# Patient Record
Sex: FEMALE | Race: WHITE | NOT HISPANIC OR LATINO | URBAN - METROPOLITAN AREA
[De-identification: names, ages, dates, MRNs, and addresses within clinical notes are randomized per-mention and may not be internally consistent; named-entity substitution may affect disease eponyms.]

---

## 2021-10-11 ENCOUNTER — HOSPITAL ENCOUNTER (EMERGENCY)
Age: 56
Discharge: LEFT BEFORE DISCHARGE | End: 2021-10-11

## 2021-10-11 VITALS
RESPIRATION RATE: 18 BRPM | SYSTOLIC BLOOD PRESSURE: 141 MMHG | TEMPERATURE: 98 F | OXYGEN SATURATION: 98 % | DIASTOLIC BLOOD PRESSURE: 97 MMHG | HEART RATE: 103 BPM

## 2022-02-18 ENCOUNTER — LAB ENCOUNTER (OUTPATIENT)
Dept: LAB | Age: 57
End: 2022-02-18
Attending: FAMILY MEDICINE
Payer: COMMERCIAL

## 2022-02-18 ENCOUNTER — OFFICE VISIT (OUTPATIENT)
Dept: FAMILY MEDICINE CLINIC | Facility: CLINIC | Age: 57
End: 2022-02-18
Payer: COMMERCIAL

## 2022-02-18 ENCOUNTER — HOSPITAL ENCOUNTER (OUTPATIENT)
Dept: GENERAL RADIOLOGY | Facility: HOSPITAL | Age: 57
Discharge: HOME | End: 2022-02-18
Attending: FAMILY MEDICINE
Payer: COMMERCIAL

## 2022-02-18 VITALS
DIASTOLIC BLOOD PRESSURE: 83 MMHG | HEART RATE: 92 BPM | BODY MASS INDEX: 27.92 KG/M2 | SYSTOLIC BLOOD PRESSURE: 131 MMHG | HEIGHT: 67.5 IN | WEIGHT: 180 LBS

## 2022-02-18 DIAGNOSIS — Z00.00 ANNUAL PHYSICAL EXAM: Primary | ICD-10-CM

## 2022-02-18 DIAGNOSIS — R73.9 HYPERGLYCEMIA: ICD-10-CM

## 2022-02-18 DIAGNOSIS — Z00.00 ANNUAL PHYSICAL EXAM: ICD-10-CM

## 2022-02-18 DIAGNOSIS — U07.1 COVID: ICD-10-CM

## 2022-02-18 DIAGNOSIS — Z12.39 ENCOUNTER FOR SCREENING FOR MALIGNANT NEOPLASM OF BREAST, UNSPECIFIED SCREENING MODALITY: ICD-10-CM

## 2022-02-18 PROBLEM — E11.9 TYPE 2 DIABETES MELLITUS (HCC): Status: ACTIVE | Noted: 2022-02-18

## 2022-02-18 LAB
ALBUMIN SERPL-MCNC: 4.2 G/DL (ref 3.4–5)
ALBUMIN/GLOB SERPL: 1.4 {RATIO} (ref 1–2)
ALP LIVER SERPL-CCNC: 72 U/L
ALT SERPL-CCNC: 58 U/L
ANION GAP SERPL CALC-SCNC: 8 MMOL/L (ref 0–18)
AST SERPL-CCNC: 16 U/L (ref 15–37)
BASOPHILS # BLD AUTO: 0.04 X10(3) UL (ref 0–0.2)
BASOPHILS NFR BLD AUTO: 0.9 %
BILIRUB SERPL-MCNC: 0.4 MG/DL (ref 0.1–2)
BUN BLD-MCNC: 19 MG/DL (ref 7–18)
BUN/CREAT SERPL: 30.6 (ref 10–20)
CALCIUM BLD-MCNC: 9.5 MG/DL (ref 8.5–10.1)
CARTRIDGE EXPIRATION DATE: ABNORMAL DATE
CHLORIDE SERPL-SCNC: 106 MMOL/L (ref 98–112)
CHOLEST SERPL-MCNC: 265 MG/DL (ref ?–200)
CO2 SERPL-SCNC: 27 MMOL/L (ref 21–32)
CREAT BLD-MCNC: 0.62 MG/DL
DEPRECATED RDW RBC AUTO: 41 FL (ref 35.1–46.3)
EOSINOPHIL # BLD AUTO: 0.08 X10(3) UL (ref 0–0.7)
EOSINOPHIL NFR BLD AUTO: 1.8 %
ERYTHROCYTE [DISTWIDTH] IN BLOOD BY AUTOMATED COUNT: 12.3 % (ref 11–15)
FASTING PATIENT LIPID ANSWER: YES
FASTING STATUS PATIENT QL REPORTED: YES
GLOBULIN PLAS-MCNC: 3.1 G/DL (ref 2.8–4.4)
GLUCOSE BLD-MCNC: 121 MG/DL (ref 70–99)
HCT VFR BLD AUTO: 46.4 %
HDLC SERPL-MCNC: 48 MG/DL (ref 40–59)
HEMOGLOBIN A1C: 6.3 % (ref 4.3–5.6)
HGB BLD-MCNC: 15 G/DL
IMM GRANULOCYTES # BLD AUTO: 0 X10(3) UL (ref 0–1)
IMM GRANULOCYTES NFR BLD: 0 %
LDLC SERPL CALC-MCNC: 182 MG/DL (ref ?–100)
LYMPHOCYTES # BLD AUTO: 1.53 X10(3) UL (ref 1–4)
LYMPHOCYTES NFR BLD AUTO: 33.8 %
MCH RBC QN AUTO: 29.5 PG (ref 26–34)
MCHC RBC AUTO-ENTMCNC: 32.3 G/DL (ref 31–37)
MCV RBC AUTO: 91.2 FL
MONOCYTES # BLD AUTO: 0.28 X10(3) UL (ref 0.1–1)
MONOCYTES NFR BLD AUTO: 6.2 %
NEUTROPHILS # BLD AUTO: 2.6 X10 (3) UL (ref 1.5–7.7)
NEUTROPHILS # BLD AUTO: 2.6 X10(3) UL (ref 1.5–7.7)
NEUTROPHILS NFR BLD AUTO: 57.3 %
NONHDLC SERPL-MCNC: 217 MG/DL (ref ?–130)
OSMOLALITY SERPL CALC.SUM OF ELEC: 296 MOSM/KG (ref 275–295)
PLATELET # BLD AUTO: 214 10(3)UL (ref 150–450)
POTASSIUM SERPL-SCNC: 4.5 MMOL/L (ref 3.5–5.1)
PROT SERPL-MCNC: 7.3 G/DL (ref 6.4–8.2)
RBC # BLD AUTO: 5.09 X10(6)UL
SODIUM SERPL-SCNC: 141 MMOL/L (ref 136–145)
TRIGL SERPL-MCNC: 185 MG/DL (ref 30–149)
TSI SER-ACNC: 1.65 MIU/ML (ref 0.36–3.74)
VLDLC SERPL CALC-MCNC: 38 MG/DL (ref 0–30)
WBC # BLD AUTO: 4.5 X10(3) UL (ref 4–11)

## 2022-02-18 PROCEDURE — 83036 HEMOGLOBIN GLYCOSYLATED A1C: CPT | Performed by: FAMILY MEDICINE

## 2022-02-18 PROCEDURE — 80061 LIPID PANEL: CPT

## 2022-02-18 PROCEDURE — 85025 COMPLETE CBC W/AUTO DIFF WBC: CPT

## 2022-02-18 PROCEDURE — 3079F DIAST BP 80-89 MM HG: CPT | Performed by: FAMILY MEDICINE

## 2022-02-18 PROCEDURE — 3008F BODY MASS INDEX DOCD: CPT | Performed by: FAMILY MEDICINE

## 2022-02-18 PROCEDURE — 80053 COMPREHEN METABOLIC PANEL: CPT

## 2022-02-18 PROCEDURE — 84443 ASSAY THYROID STIM HORMONE: CPT

## 2022-02-18 PROCEDURE — 99386 PREV VISIT NEW AGE 40-64: CPT | Performed by: FAMILY MEDICINE

## 2022-02-18 PROCEDURE — 3075F SYST BP GE 130 - 139MM HG: CPT | Performed by: FAMILY MEDICINE

## 2022-02-18 PROCEDURE — 36415 COLL VENOUS BLD VENIPUNCTURE: CPT

## 2022-02-18 PROCEDURE — 71046 X-RAY EXAM CHEST 2 VIEWS: CPT | Performed by: FAMILY MEDICINE

## 2022-02-18 RX ORDER — AMLODIPINE BESYLATE 5 MG/1
5 TABLET ORAL DAILY
Qty: 90 TABLET | Refills: 0 | Status: SHIPPED | OUTPATIENT
Start: 2022-02-18

## 2022-02-18 RX ORDER — LOSARTAN POTASSIUM 50 MG/1
50 TABLET ORAL DAILY
COMMUNITY
Start: 2022-02-08 | End: 2022-02-18

## 2022-02-18 RX ORDER — LOSARTAN POTASSIUM 50 MG/1
50 TABLET ORAL DAILY
Qty: 90 TABLET | Refills: 0 | Status: SHIPPED | OUTPATIENT
Start: 2022-02-18

## 2022-02-18 RX ORDER — ERGOCALCIFEROL 1.25 MG/1
50000 CAPSULE ORAL WEEKLY
Qty: 90 CAPSULE | Refills: 0 | Status: SHIPPED | OUTPATIENT
Start: 2022-02-18

## 2022-02-18 RX ORDER — AMLODIPINE BESYLATE 5 MG/1
5 TABLET ORAL DAILY
COMMUNITY
Start: 2021-12-15 | End: 2022-02-18

## 2022-02-18 RX ORDER — ERGOCALCIFEROL 1.25 MG/1
50000 CAPSULE ORAL WEEKLY
COMMUNITY
Start: 2022-02-04 | End: 2022-02-18

## 2022-02-20 ENCOUNTER — TELEPHONE (OUTPATIENT)
Dept: FAMILY MEDICINE CLINIC | Facility: CLINIC | Age: 57
End: 2022-02-20

## 2022-02-28 ENCOUNTER — TELEPHONE (OUTPATIENT)
Dept: FAMILY MEDICINE CLINIC | Facility: CLINIC | Age: 57
End: 2022-02-28

## 2022-02-28 RX ORDER — ROSUVASTATIN CALCIUM 20 MG/1
20 TABLET, COATED ORAL NIGHTLY
Qty: 90 TABLET | Refills: 1 | Status: SHIPPED | OUTPATIENT
Start: 2022-02-28

## 2022-03-21 ENCOUNTER — TELEPHONE (OUTPATIENT)
Dept: FAMILY MEDICINE CLINIC | Facility: CLINIC | Age: 57
End: 2022-03-21

## 2022-05-07 NOTE — TELEPHONE ENCOUNTER
Passes protocol but with POP UP HIGH WARNING ALERT. Refill passed per [facility] protocol.      Requested Prescriptions   Pending Prescriptions Disp Refills    LOSARTAN 48 MG Oral Tab [Pharmacy Med Name: LOSARTAN 50MG TABLETS] 90 tablet 0     Sig: TAKE 1 TABLET(50 MG) BY MOUTH DAILY        Hypertensive Medications Protocol Passed - 5/7/2022  8:04 AM        Passed - CMP or BMP in past 12 months        Passed - Appointment in past 6 or next 3 months        Passed - GFR Non- > 50     Lab Results   Component Value Date    GFRNAA 102 02/18/2022                        Future Appointments         Provider Department Appt Notes    In 1 week Ivy Mckeon MD [facility], [place] 3 month f/u             Recent Outpatient Visits              2 months ago Annual physical exam    Tanvir Kelley MD    Office Visit

## 2022-05-16 RX ORDER — AMLODIPINE BESYLATE 5 MG/1
5 TABLET ORAL DAILY
Qty: 90 TABLET | Refills: 0 | Status: SHIPPED | OUTPATIENT
Start: 2022-05-16

## 2022-05-16 NOTE — TELEPHONE ENCOUNTER
Message noted: Chart reviewed and may refill medication as requested. Prescription sent to listed pharmacy. Please notify patient.  Further refills as per Dr. MONACO

## 2022-05-16 NOTE — TELEPHONE ENCOUNTER
Refill passed per [facility] protocol. Routed to Dr. Kathy Rock to review HIGH interaction warning. Dr. Olga Merlin out of office.    Requested Prescriptions   Pending Prescriptions Disp Refills    AMLODIPINE 5 MG Oral Tab [Pharmacy Med Name: AMLODIPINE BESYLATE 5MG TABLETS] 90 tablet 0     Sig: TAKE 1 TABLET(5 MG) BY MOUTH DAILY        Hypertensive Medications Protocol Passed - 5/15/2022  3:32 PM        Passed - CMP or BMP in past 12 months        Passed - Appointment in past 6 or next 3 months        Passed - GFR Non- > 50     Lab Results   Component Value Date    GFRNAA 102 02/18/2022                      Recent Outpatient Visits              2 months ago Annual physical exam    Aura Leyden, MD    Office Visit           Future Appointments         Provider Department Appt Notes    In 4 days Carmel Conner MD [facility] [facility], [facility], [facility]u

## 2022-05-17 NOTE — TELEPHONE ENCOUNTER
Refill passed per [address] [place] protocol.     Requested Prescriptions   Pending Prescriptions Disp Refills    METFORMIN 500 MG Oral Tab [Pharmacy Med Name: METFORMIN 500MG TABLETS] 90 tablet 1     Sig: TAKE 1 TABLET(500 MG) BY MOUTH DAILY WITH BREAKFAST        Diabetes Medication Protocol Passed - 5/17/2022  8:04 AM        Passed - Last A1C < 7.5 and within past 6 months     Lab Results   Component Value Date    A1C 6.3 (A) 02/18/2022               Passed - Appointment in past 6 or next 3 months        Passed - GFR Non- > 50     Lab Results   Component Value Date    GFRNAA 102 02/18/2022                 Passed - GFR in the past 12 months              Recent Outpatient Visits              2 months ago Annual physical exam    Rama Ugarte MD    Office Visit            Future Appointments         Provider Department Appt Notes    In 3 days Blayne Tai MD [address], [address] 3 month f/u

## 2022-05-19 RX ORDER — LOSARTAN POTASSIUM 50 MG/1
50 TABLET ORAL DAILY
Qty: 90 TABLET | Refills: 0 | Status: SHIPPED | OUTPATIENT
Start: 2022-05-19 | End: 2022-12-06

## 2022-05-20 ENCOUNTER — OFFICE VISIT (OUTPATIENT)
Dept: FAMILY MEDICINE CLINIC | Facility: CLINIC | Age: 57
End: 2022-05-20
Payer: COMMERCIAL

## 2022-05-20 VITALS
BODY MASS INDEX: 26.99 KG/M2 | DIASTOLIC BLOOD PRESSURE: 80 MMHG | WEIGHT: 174 LBS | HEART RATE: 90 BPM | HEIGHT: 67.5 IN | SYSTOLIC BLOOD PRESSURE: 132 MMHG

## 2022-05-20 DIAGNOSIS — Z00.00 ANNUAL PHYSICAL EXAM: Primary | ICD-10-CM

## 2022-05-20 DIAGNOSIS — Z12.39 ENCOUNTER FOR SCREENING FOR MALIGNANT NEOPLASM OF BREAST, UNSPECIFIED SCREENING MODALITY: ICD-10-CM

## 2022-05-20 DIAGNOSIS — Z12.31 ENCOUNTER FOR SCREENING MAMMOGRAM FOR BREAST CANCER: ICD-10-CM

## 2022-05-20 DIAGNOSIS — I10 ESSENTIAL HYPERTENSION: ICD-10-CM

## 2022-05-20 DIAGNOSIS — Z12.11 SCREENING FOR COLON CANCER: ICD-10-CM

## 2022-05-20 DIAGNOSIS — E11.9 DIABETES MELLITUS TYPE 2 IN NONOBESE (HCC): ICD-10-CM

## 2022-05-20 PROBLEM — U07.1 DISEASE DUE TO SEVERE ACUTE RESPIRATORY SYNDROME CORONAVIRUS 2 (SARS-COV-2): Status: ACTIVE | Noted: 2022-05-20

## 2022-05-20 LAB
CARTRIDGE EXPIRATION DATE: ABNORMAL DATE
HEMOGLOBIN A1C: 6.4 % (ref 4.3–5.6)

## 2022-05-20 PROCEDURE — 3075F SYST BP GE 130 - 139MM HG: CPT | Performed by: FAMILY MEDICINE

## 2022-05-20 PROCEDURE — 3044F HG A1C LEVEL LT 7.0%: CPT | Performed by: FAMILY MEDICINE

## 2022-05-20 PROCEDURE — 83036 HEMOGLOBIN GLYCOSYLATED A1C: CPT | Performed by: FAMILY MEDICINE

## 2022-05-20 PROCEDURE — 3008F BODY MASS INDEX DOCD: CPT | Performed by: FAMILY MEDICINE

## 2022-05-20 PROCEDURE — 99396 PREV VISIT EST AGE 40-64: CPT | Performed by: FAMILY MEDICINE

## 2022-05-20 PROCEDURE — 3079F DIAST BP 80-89 MM HG: CPT | Performed by: FAMILY MEDICINE

## 2022-05-23 LAB — HPV I/H RISK 1 DNA SPEC QL NAA+PROBE: POSITIVE

## 2022-05-31 LAB
HPV16 DNA CVX QL PROBE+SIG AMP: NEGATIVE
HPV18 DNA CVX QL PROBE+SIG AMP: NEGATIVE

## 2022-06-09 ENCOUNTER — VIRTUAL PHONE E/M (OUTPATIENT)
Dept: FAMILY MEDICINE CLINIC | Facility: CLINIC | Age: 57
End: 2022-06-09
Payer: COMMERCIAL

## 2022-06-09 DIAGNOSIS — B97.7 HPV IN FEMALE: Primary | ICD-10-CM

## 2022-06-09 PROCEDURE — G2252 BRIEF CHKIN BY MD/QHP, 11-20: HCPCS | Performed by: FAMILY MEDICINE

## 2022-08-30 RX ORDER — ROSUVASTATIN CALCIUM 20 MG/1
20 TABLET, COATED ORAL NIGHTLY
Qty: 90 TABLET | Refills: 1 | Status: SHIPPED | OUTPATIENT
Start: 2022-08-30

## 2022-08-30 NOTE — TELEPHONE ENCOUNTER
Please review refill failed/no protocol     Requested Prescriptions     Pending Prescriptions Disp Refills    ROSUVASTATIN 20 MG Oral Tab [Pharmacy Med Name: ROSUVASTATIN 20MG TABLETS] 90 tablet 1     Sig: TAKE 1 TABLET(20 MG) BY MOUTH EVERY NIGHT         Recent Visits  Date Type Provider Dept   05/20/22 Office Visit Zee Polk MD [facility]   02/18/22 Office Visit Zee Polk MD [facility]   Showing recent visits within past 540 days with a meds authorizing provider and meeting all other requirements  Future Appointments  Date Type Provider Dept   11/18/22 Appointment Zee Polk MD [facility]   Showing future appointments within next 150 days with a meds authorizing provider and meeting all other requirements    Requested Prescriptions   Pending Prescriptions Disp Refills    ROSUVASTATIN 20 MG Oral Tab [Pharmacy Med Name: ROSUVASTATIN 20MG TABLETS] 90 tablet 1     Sig: TAKE 1 TABLET(20 MG) BY MOUTH EVERY NIGHT        Cholesterol Medication Protocol Failed - 8/28/2022  4:54 PM        Failed - Last LDL < 130     Lab Results   Component Value Date     (H) 02/18/2022               Passed - ALT in past 12 months        Passed - LDL in past 12 months        Passed - Last ALT < 80       Lab Results   Component Value Date    ALT 58 (H) 02/18/2022             Passed - In person appointment or virtual visit in the past 12 mos or appointment in next 3 mos       Recent Outpatient Visits              2 months ago HPV in female    [facility], [address] MD    [facility]    3 months ago Annual physical exam    Sangeeta Rosen MD    Office Visit    6 months ago Annual physical exam    Sangeeta Rosen MD    Office Visit     Future Appointments         Provider Department Appt Notes    In 2 months Zee Polk MD [facility], [address] 6 months follow up                   Future Appointments         Provider Department Appt Notes    In 2 months Edna Rodgers MD [facility], [facility], [place] 6 months follow up          Recent Outpatient Visits              2 months ago HPV in female    Nicole Mccormick MD    [facility]    3 months ago Annual physical exam    Nicole Mccormick MD    Office Visit    6 months ago Annual physical exam    Nicole Mccormick MD    Office Visit

## 2022-09-15 RX ORDER — AMLODIPINE BESYLATE 5 MG/1
5 TABLET ORAL DAILY
Qty: 90 TABLET | Refills: 0 | Status: SHIPPED | OUTPATIENT
Start: 2022-09-15 | End: 2023-01-20

## 2022-11-18 ENCOUNTER — LAB ENCOUNTER (OUTPATIENT)
Dept: LAB | Age: 57
End: 2022-11-18
Attending: FAMILY MEDICINE
Payer: COMMERCIAL

## 2022-11-18 ENCOUNTER — OFFICE VISIT (OUTPATIENT)
Dept: FAMILY MEDICINE CLINIC | Facility: CLINIC | Age: 57
End: 2022-11-18
Payer: COMMERCIAL

## 2022-11-18 VITALS
SYSTOLIC BLOOD PRESSURE: 127 MMHG | OXYGEN SATURATION: 97 % | DIASTOLIC BLOOD PRESSURE: 86 MMHG | WEIGHT: 176 LBS | BODY MASS INDEX: 27.3 KG/M2 | HEART RATE: 93 BPM | HEIGHT: 67.5 IN

## 2022-11-18 DIAGNOSIS — R73.03 PREDIABETES: ICD-10-CM

## 2022-11-18 DIAGNOSIS — Z12.11 SCREENING FOR COLON CANCER: ICD-10-CM

## 2022-11-18 DIAGNOSIS — R73.03 PREDIABETES: Primary | ICD-10-CM

## 2022-11-18 LAB
ALBUMIN SERPL-MCNC: 4.1 G/DL (ref 3.4–5)
ALP LIVER SERPL-CCNC: 70 U/L
ALT SERPL-CCNC: 41 U/L
AST SERPL-CCNC: 13 U/L (ref 15–37)
BILIRUB DIRECT SERPL-MCNC: <0.1 MG/DL (ref 0–0.2)
BILIRUB SERPL-MCNC: 0.3 MG/DL (ref 0.1–2)
CREAT UR-SCNC: 133 MG/DL
EST. AVERAGE GLUCOSE BLD GHB EST-MCNC: 131 MG/DL (ref 68–126)
HBA1C MFR BLD: 6.2 % (ref ?–5.7)
MICROALBUMIN UR-MCNC: 2.18 MG/DL
MICROALBUMIN/CREAT 24H UR-RTO: 16.4 UG/MG (ref ?–30)
PROT SERPL-MCNC: 7.1 G/DL (ref 6.4–8.2)

## 2022-11-18 PROCEDURE — 36415 COLL VENOUS BLD VENIPUNCTURE: CPT

## 2022-11-18 PROCEDURE — 3008F BODY MASS INDEX DOCD: CPT | Performed by: FAMILY MEDICINE

## 2022-11-18 PROCEDURE — 82043 UR ALBUMIN QUANTITATIVE: CPT

## 2022-11-18 PROCEDURE — 80076 HEPATIC FUNCTION PANEL: CPT

## 2022-11-18 PROCEDURE — 3079F DIAST BP 80-89 MM HG: CPT | Performed by: FAMILY MEDICINE

## 2022-11-18 PROCEDURE — 99213 OFFICE O/P EST LOW 20 MIN: CPT | Performed by: FAMILY MEDICINE

## 2022-11-18 PROCEDURE — 3074F SYST BP LT 130 MM HG: CPT | Performed by: FAMILY MEDICINE

## 2022-11-18 PROCEDURE — 82570 ASSAY OF URINE CREATININE: CPT

## 2022-11-18 PROCEDURE — 83036 HEMOGLOBIN GLYCOSYLATED A1C: CPT

## 2023-01-20 RX ORDER — AMLODIPINE BESYLATE 5 MG/1
TABLET ORAL
Qty: 90 TABLET | Refills: 0 | Status: SHIPPED | OUTPATIENT
Start: 2023-01-20

## 2023-05-01 ENCOUNTER — NURSE TRIAGE (OUTPATIENT)
Dept: FAMILY MEDICINE CLINIC | Facility: CLINIC | Age: 58
End: 2023-05-01

## 2023-05-01 NOTE — TELEPHONE ENCOUNTER
Left message for patient to call back and discuss. Attempted to call son but phone rang without ability to leave a message. Anesivahart message sent as well.

## 2023-05-02 ENCOUNTER — PATIENT MESSAGE (OUTPATIENT)
Dept: ADMINISTRATIVE | Age: 58
End: 2023-05-02

## 2023-05-02 NOTE — TELEPHONE ENCOUNTER
Please review; protocol failed. Or has no protocol. Pt needs to schedule OV. Please call pt    Requested Prescriptions   Pending Prescriptions Disp Refills    LOSARTAN 50 MG Oral Tab [Pharmacy Med Name: LOSARTAN 50MG TABLETS] 30 tablet 0     Sig: TAKE 1 TABLET(50 MG) BY MOUTH DAILY       Hypertensive Medications Protocol Failed - 4/29/2023  2:49 PM        Failed - CMP or BMP in past 6 months     No results found for this or any previous visit (from the past 4392 hour(s)).               Failed - EGFRCR or GFRNAA > 50     GFR Evaluation              Passed - In person appointment in the past 12 or next 3 months     Recent Outpatient Visits              5 months ago Prediabetes    Sherman Carrasco MD    Office Visit    10 months ago HPV in female    Junior Irvin MD    [facility] E[facility]    11 months ago Annual physical exam    Junior Irvin MD    Office Visit    1 year ago Annual physical exam    Sherman Carrasco MD    Office Visit                      Passed - Last BP reading less than 140/90     BP Readings from Last 1 Encounters:  11/18/22 : 127/86                Passed - In person appointment or virtual visit in the past 6 months     Recent Outpatient Visits              5 months ago Prediabetes    Sherman Carrasco MD    Office Visit    10 months ago HPV in female    Blanca Arora MD    [facility] E/M    11 months ago Annual physical exam    Sherman Carrasco MD    Office Visit    1 year ago Annual physical exam    Sherman Carrasco MD    Office Visit Recent Outpatient Visits              5 months ago Prediabetes    Jocelyn Kim MD    Office Visit    10 months ago HPV in female    Jocelyn Kim MD    [facility] E/M    11 months ago Annual physical exam    Jocelyn Kim MD    Office Visit    1 year ago Annual physical exam    Jocelyn Kim MD    Office Visit

## 2023-05-04 NOTE — TELEPHONE ENCOUNTER
See below    Appointment Information:     Provider:     Edwina Mccall MD   Date:         5/12/2023  Time:         10:10 AM CDT  Dept:        [facility], [address], [address]  [address] [facility] [address] [phone]

## 2023-05-04 NOTE — TELEPHONE ENCOUNTER
Please review. Protocol failed / No Protocol. Future Appointments   Date Time Provider Marissa Singleton   5/12/2023 10:10 AM [facility]       Requested Prescriptions   Pending Prescriptions Disp Refills    losartan 50 MG Oral Tab [Pharmacy Med Name: LOSARTAN 50MG TABLETS] 30 tablet 0     Sig: Take 1 tablet (50 mg total) by mouth daily. Hypertensive Medications Protocol Failed - 4/29/2023  2:49 PM        Failed - CMP or BMP in past 6 months     No results found for this or any previous visit (from the past 4392 hour(s)).               Failed - EGFRCR or GFRNAA > 50     GFR Evaluation              Passed - In person appointment in the past 12 or next 3 months     Recent Outpatient Visits              5 months ago Prediabetes    Malissa Cooper MD    Office Visit    10 months ago HPV in female    Nanetta Hockey, Lundy Curling, MD    Virtual Phone E/M    11 months ago Annual physical exam    Nanetta Hockey, Lundy Curling, MD    Office Visit    1 year ago Annual physical exam    Nanetta Hockey, Lundy Curling, MD    Office Visit          Future Appointments         Provider Department Appt Notes    In 1 week MD Dona Arnold [place] follow up medications  policy inf *ba               Passed - Last BP reading less than 140/90     BP Readings from Last 1 Encounters:  11/18/22 : 127/86                Passed - In person appointment or virtual visit in the past 6 months     Recent Outpatient Visits              5 months ago Prediabetes    Malissa Cooper MD    Office Visit    10 months ago HPV in female    Malissa Cooper MD    Virtual Phone E/M    36 months ago Annual physical exam    Collette Long MD    Office Visit    1 year ago Annual physical exam    Collette Long MD    Office Visit          Future Appointments         Provider Department Appt Notes    In 1 week Blayne Tai MD [address], [facility] follow up medications  policy inf *ba

## 2023-05-05 RX ORDER — LOSARTAN POTASSIUM 50 MG/1
50 TABLET ORAL DAILY
Qty: 30 TABLET | Refills: 0 | Status: SHIPPED | OUTPATIENT
Start: 2023-05-05

## 2023-05-12 ENCOUNTER — OFFICE VISIT (OUTPATIENT)
Dept: FAMILY MEDICINE CLINIC | Facility: CLINIC | Age: 58
End: 2023-05-12

## 2023-05-12 VITALS
SYSTOLIC BLOOD PRESSURE: 114 MMHG | HEIGHT: 67.5 IN | OXYGEN SATURATION: 97 % | WEIGHT: 176 LBS | BODY MASS INDEX: 27.3 KG/M2 | DIASTOLIC BLOOD PRESSURE: 70 MMHG | RESPIRATION RATE: 18 BRPM | HEART RATE: 91 BPM

## 2023-05-12 DIAGNOSIS — Z12.11 SCREENING FOR COLON CANCER: Primary | ICD-10-CM

## 2023-05-12 RX ORDER — MONTELUKAST SODIUM 10 MG/1
10 TABLET ORAL DAILY
Qty: 30 TABLET | Refills: 1 | Status: SHIPPED | OUTPATIENT
Start: 2023-05-12 | End: 2024-05-06

## 2023-05-12 RX ORDER — ALBUTEROL SULFATE 90 UG/1
2 AEROSOL, METERED RESPIRATORY (INHALATION) EVERY 6 HOURS PRN
Qty: 1 EACH | Refills: 1 | Status: SHIPPED | OUTPATIENT
Start: 2023-05-12

## 2023-05-12 RX ORDER — ESCITALOPRAM OXALATE 5 MG/1
5 TABLET ORAL DAILY
Qty: 90 TABLET | Refills: 0 | Status: SHIPPED | OUTPATIENT
Start: 2023-05-12

## 2023-06-02 ENCOUNTER — LAB ENCOUNTER (OUTPATIENT)
Dept: LAB | Age: 58
End: 2023-06-02
Attending: FAMILY MEDICINE
Payer: COMMERCIAL

## 2023-06-02 ENCOUNTER — OFFICE VISIT (OUTPATIENT)
Dept: FAMILY MEDICINE CLINIC | Facility: CLINIC | Age: 58
End: 2023-06-02

## 2023-06-02 VITALS
OXYGEN SATURATION: 96 % | SYSTOLIC BLOOD PRESSURE: 136 MMHG | BODY MASS INDEX: 27.15 KG/M2 | WEIGHT: 175 LBS | HEIGHT: 67.5 IN | RESPIRATION RATE: 14 BRPM | DIASTOLIC BLOOD PRESSURE: 76 MMHG | HEART RATE: 88 BPM

## 2023-06-02 DIAGNOSIS — F41.9 ANXIETY: ICD-10-CM

## 2023-06-02 DIAGNOSIS — R05.9 COUGH, UNSPECIFIED TYPE: Primary | ICD-10-CM

## 2023-06-02 DIAGNOSIS — E11.9 DIABETES MELLITUS TYPE 2 IN NONOBESE (HCC): ICD-10-CM

## 2023-06-02 LAB
CHOLEST SERPL-MCNC: 228 MG/DL (ref ?–200)
EST. AVERAGE GLUCOSE BLD GHB EST-MCNC: 143 MG/DL (ref 68–126)
FASTING PATIENT LIPID ANSWER: YES
HBA1C MFR BLD: 6.6 % (ref ?–5.7)
HDLC SERPL-MCNC: 54 MG/DL (ref 40–59)
LDLC SERPL CALC-MCNC: 147 MG/DL (ref ?–100)
NONHDLC SERPL-MCNC: 174 MG/DL (ref ?–130)
TRIGL SERPL-MCNC: 150 MG/DL (ref 30–149)
VLDLC SERPL CALC-MCNC: 28 MG/DL (ref 0–30)

## 2023-06-02 PROCEDURE — 99214 OFFICE O/P EST MOD 30 MIN: CPT | Performed by: FAMILY MEDICINE

## 2023-06-02 PROCEDURE — 3078F DIAST BP <80 MM HG: CPT | Performed by: FAMILY MEDICINE

## 2023-06-02 PROCEDURE — 3008F BODY MASS INDEX DOCD: CPT | Performed by: FAMILY MEDICINE

## 2023-06-02 PROCEDURE — 80061 LIPID PANEL: CPT

## 2023-06-02 PROCEDURE — 36415 COLL VENOUS BLD VENIPUNCTURE: CPT

## 2023-06-02 PROCEDURE — 3075F SYST BP GE 130 - 139MM HG: CPT | Performed by: FAMILY MEDICINE

## 2023-06-02 PROCEDURE — 83036 HEMOGLOBIN GLYCOSYLATED A1C: CPT

## 2023-07-20 RX ORDER — ESCITALOPRAM OXALATE 10 MG/1
10 TABLET ORAL DAILY
Qty: 90 TABLET | Refills: 0 | OUTPATIENT
Start: 2023-07-20

## 2023-09-23 ENCOUNTER — HOSPITAL ENCOUNTER (OUTPATIENT)
Dept: GENERAL RADIOLOGY | Age: 58
Discharge: HOME | End: 2023-09-23
Attending: FAMILY MEDICINE
Payer: COMMERCIAL

## 2023-09-23 DIAGNOSIS — R05.9 COUGH, UNSPECIFIED TYPE: ICD-10-CM

## 2023-09-23 PROCEDURE — 71046 X-RAY EXAM CHEST 2 VIEWS: CPT | Performed by: FAMILY MEDICINE

## 2023-10-06 ENCOUNTER — OFFICE VISIT (OUTPATIENT)
Dept: FAMILY MEDICINE CLINIC | Facility: CLINIC | Age: 58
End: 2023-10-06

## 2023-10-06 VITALS
OXYGEN SATURATION: 100 % | DIASTOLIC BLOOD PRESSURE: 79 MMHG | HEART RATE: 84 BPM | WEIGHT: 175 LBS | RESPIRATION RATE: 16 BRPM | SYSTOLIC BLOOD PRESSURE: 119 MMHG | BODY MASS INDEX: 27.15 KG/M2 | HEIGHT: 67.5 IN

## 2023-10-06 DIAGNOSIS — E11.69 DIABETES MELLITUS TYPE 2 IN OBESE: ICD-10-CM

## 2023-10-06 DIAGNOSIS — Z12.31 SCREENING MAMMOGRAM FOR BREAST CANCER: ICD-10-CM

## 2023-10-06 DIAGNOSIS — E66.9 DIABETES MELLITUS TYPE 2 IN OBESE: ICD-10-CM

## 2023-10-06 DIAGNOSIS — Z12.11 SCREENING FOR COLON CANCER: Primary | ICD-10-CM

## 2023-10-06 LAB
CARTRIDGE LOT#: ABNORMAL NUMERIC
HEMOGLOBIN A1C: 6.3 % (ref 4.3–5.6)

## 2023-10-06 PROCEDURE — 3074F SYST BP LT 130 MM HG: CPT | Performed by: FAMILY MEDICINE

## 2023-10-06 PROCEDURE — 3044F HG A1C LEVEL LT 7.0%: CPT | Performed by: FAMILY MEDICINE

## 2023-10-06 PROCEDURE — 99396 PREV VISIT EST AGE 40-64: CPT | Performed by: FAMILY MEDICINE

## 2023-10-06 PROCEDURE — 3078F DIAST BP <80 MM HG: CPT | Performed by: FAMILY MEDICINE

## 2023-10-06 PROCEDURE — 3008F BODY MASS INDEX DOCD: CPT | Performed by: FAMILY MEDICINE

## 2023-10-06 PROCEDURE — 83036 HEMOGLOBIN GLYCOSYLATED A1C: CPT | Performed by: FAMILY MEDICINE

## 2023-10-06 NOTE — PROGRESS NOTES
Subjective:   Patient ID: Arcenio Robertson is a 65year old female. HPI  Here for annual physical   Also f/u diabetes hypertension and depression   Doing well   History/Other:   Review of Systems  Constitutional: Negative. Negative for activity change, appetite change, diaphoresis and fatigue. Respiratory: Negative. Negative for apnea, cough, chest tightness and shortness of breath. Cardiovascular: Negative. Negative for chest pain, palpitations and leg swelling. Gastrointestinal: Negative. Negative for abdominal pain. Skin: Negative. Psychiatric/Behavioral: Negative. On the phone patient does not appear to be in distress    Current Outpatient Medications   Medication Sig Dispense Refill    montelukast (SINGULAIR) 10 MG Oral Tab Take 1 tablet (10 mg total) by mouth daily. 90 tablet 1    escitalopram 10 MG Oral Tab Take 1 tablet (10 mg total) by mouth daily. For mood 90 tablet 0    losartan 50 MG Oral Tab Take 1 tablet (50 mg total) by mouth daily. 90 tablet 3    amLODIPine 5 MG Oral Tab Take 1 tablet (5 mg total) by mouth daily. 90 tablet 1    metFORMIN 500 MG Oral Tab Take 1 tablet (500 mg total) by mouth 2 (two) times daily with meals. 180 tablet 1    albuterol (PROVENTIL HFA) 108 (90 Base) MCG/ACT Inhalation Aero Soln Inhale 2 puffs into the lungs every 6 (six) hours as needed for Wheezing. 1 each 1    Ergocalciferol (VITAMIN D OR) Take by mouth. Allergies:Not on File    Objective:   Physical Exam  Constitutional:       Appearance: She is well-developed. Cardiovascular:      Rate and Rhythm: Normal rate and regular rhythm. Heart sounds: Normal heart sounds. Pulmonary:      Effort: Pulmonary effort is normal.      Breath sounds: Normal breath sounds. Abdominal:      General: Bowel sounds are normal.      Palpations: Abdomen is soft. Skin:     General: Skin is warm and dry. Neurological:      Mental Status: She is alert.       Deep Tendon Reflexes: Reflexes are normal and symmetric.          Assessment & Plan:   Screening for colon cancer  (primary encounter diagnosis)  Screening mammogram for breast cancer  Diabetes mellitus type 2 in obese   Annual physical dieat and exercise discussed   F/u 3 months   Orders Placed This Encounter      Occult Blood, Fecal, FIT Immunoassay      Meds This Visit:  Requested Prescriptions      No prescriptions requested or ordered in this encounter       Imaging & Referrals:  OPHTHALMOLOGY - INTERNAL  OP REFERRAL TO [facility] GI TELEPHONE COLON SCREEN  [facility] LEW 2D+3D SCREENING BILAT (CPT=[id number])

## 2023-10-08 ENCOUNTER — APPOINTMENT (OUTPATIENT)
Dept: LAB | Facility: HOSPITAL | Age: 58
End: 2023-10-08
Attending: FAMILY MEDICINE
Payer: COMMERCIAL

## 2023-10-08 PROCEDURE — 82274 ASSAY TEST FOR BLOOD FECAL: CPT

## 2023-10-12 ENCOUNTER — PATIENT MESSAGE (OUTPATIENT)
Dept: ADMINISTRATIVE | Age: 58
End: 2023-10-12

## 2023-10-12 DIAGNOSIS — E11.9 DIABETES MELLITUS TYPE 2 IN NONOBESE (HCC): Primary | ICD-10-CM

## 2023-10-12 LAB — HEMOCCULT STL QL: POSITIVE

## 2023-11-18 NOTE — TELEPHONE ENCOUNTER
Please review. Protocol Failed or has No Protocol. Requested Prescriptions   Pending Prescriptions Disp Refills    AMLODIPINE 5 MG Oral Tab [Pharmacy Med Name: AMLODIPINE BESYLATE 5MG TABLETS] 90 tablet 1     Sig: TAKE 1 TABLET(5 MG) BY MOUTH DAILY       Hypertensive Medications Protocol Failed - 11/17/2023  9:42 AM        Failed - CMP or BMP in past 6 months     No results found for this or any previous visit (from the past 4392 hour(s)).             Failed - EGFRCR or GFRNAA > 50     GFR Evaluation            Passed - In person appointment in the past 12 or next 3 months     Recent Outpatient Visits              1 month ago Screening for colon cancer    [address] MD    Office Visit    4 months ago Taqueria Thao MD    Office Visit    5 months ago Cough, unspecified type    [address] MD    Office Visit    6 months ago Screening for colon cancer    Mikal Cuello MD    Office Visit    1 year ago Prediabetes    Mikal Cuello MD    Office Visit                      Passed - Last BP reading less than 140/90     BP Readings from Last 1 Encounters:   10/06/23 119/79               Passed - In person appointment or virtual visit in the past 6 months     Recent Outpatient Visits              1 month ago Screening for colon cancer    [address] MD    Office Visit    4 months ago Pj Priec MD    Office Visit    5 months ago Cough, unspecified type    Mikal Cuello MD    Office Visit    6 months ago Screening for colon cancer Elton William MD    Office Visit    1 year ago Prediabetes    Elton William MD    Office Visit                           Recent Outpatient Visits              1 month ago Screening for colon cancer    Elton William MD    Office Visit    4 months ago Nena Kelley MD    Office Visit    5 months ago Cough, unspecified type    Elton William MD    Office Visit    6 months ago Screening for colon cancer    Elton William MD    Office Visit    1 year ago Prediabetes    Elton William MD    Office Visit

## 2023-11-21 RX ORDER — AMLODIPINE BESYLATE 5 MG/1
5 TABLET ORAL DAILY
Qty: 90 TABLET | Refills: 0 | Status: SHIPPED | OUTPATIENT
Start: 2023-11-21

## 2023-11-29 NOTE — TELEPHONE ENCOUNTER
Please review; protocol failed/no protocol.      Requested Prescriptions   Pending Prescriptions Disp Refills    METFORMIN 500 MG Oral Tab [Pharmacy Med Name: METFORMIN 500MG TABLETS] 180 tablet 1     Sig: TAKE 1 TABLET(500 MG) BY MOUTH TWICE DAILY WITH MEALS       Diabetes Medication Protocol Failed - 11/28/2023  5:53 AM        Failed - EGFRCR or GFRNAA > 50     GFR Evaluation            Failed - GFR in the past 12 months        Passed - Last A1C < 7.5 and within past 6 months     Lab Results   Component Value Date    A1C 6.3 (A) 10/06/2023             Passed - In person appointment or virtual visit in the past 6 mos or appointment in next 3 mos     Recent Outpatient Visits              1 month ago Screening for colon cancer    Onelia Mares MD    Office Visit    4 months ago Harriet Remy MD    Office Visit    5 months ago Cough, unspecified type    Onelia Mares MD    Office Visit    6 months ago Screening for colon cancer    Onelia Mares MD    Office Visit    1 year ago Prediabetes    Onelia Mares MD    Office Visit                            Recent Outpatient Visits              1 month ago Screening for colon cancer    Onelia Mares MD    Office Visit    4 months ago Harriet Remy MD    Office Visit    5 months ago Cough, unspecified type    Onelia Mares MD    Office Visit    6 months ago Screening for colon cancer    Onelia Mares MD    Office Visit    1 year ago Prediabetes    Shannen Montenegro MD    Office Visit

## 2024-02-13 RX ORDER — AMLODIPINE BESYLATE 5 MG/1
5 TABLET ORAL DAILY
Qty: 30 TABLET | Refills: 0 | Status: SHIPPED | OUTPATIENT
Start: 2024-02-13

## 2024-02-13 NOTE — TELEPHONE ENCOUNTER
Please review; protocol failed/No Protocol    LOV:10/06/2023    Last refill states pt needs an appointment   Sent MyChart to patient to schedule an appointment  [facility] calling to schedule an appointment     Outside labs  Component  Ref Range & Units 5/1/23 11:14 PM   Sodium  136 - 145 mmol/L 137   Potassium  3.5 - 5.1 mmol/L 5.0   Chloride  99 - 109 mmol/L 105   Carbon Dioxide  19 - 28 mmol/L 19   Anion Gap  mmol/L 13   Blood Urea Nitrogen  7 - 20 mg/dL 15   Creatinine  0.60 - 1.10 mg/dL 0.89   eGFRcr (CKD-EPI 2021)  >=60 mL/min/1.73 m² 76   Calcium  8.7 - 10.7 mg/dL 9.6   Protein, Total  6.4 - 8.3 g/dL 8.1   Glucose  70 - 100 mg/dL 315 High    Albumin  3.4 - 5.0 g/dL 4.6   ALT  0 - 55 units/L 34   AST  5 - 34 units/L 20   Alkaline Phosphatase  40 - 150 units/L 72   Bilirubin, Total  0.3 - 1.2 mg/dL 0.3   Resulting Agency [facility]     Specimen Collected: 05/01/23 11:14 PM Last Resulted: 05/01/23 11:48 PM   Received From: [facility]  Result Received: 05/12/23  9:52 AM       Requested Prescriptions   Pending Prescriptions Disp Refills    AMLODIPINE 5 MG Oral Tab [Pharmacy Med Name: AMLODIPINE BESYLATE 5MG TABLETS] 90 tablet 0     Sig: TAKE 1 TABLET(5 MG) BY MOUTH DAILY       Hypertension Medications Protocol Failed - 2/11/2024 10:11 AM        Failed - CMP or BMP in past 12 months        Failed - EGFRCR or GFRNAA > 50     GFR Evaluation            Passed - Last BP reading less than 140/90     BP Readings from Last 1 Encounters:   10/06/23 119/79               Passed - In person appointment or virtual visit in the past 12 mos or appointment in next 3 mos     Recent Outpatient Visits              4 months ago [facility]    [facility]    7 months ago [facility]    [facility]    [facility]    8 months ago Cough, unspecified type    [place]  [facility], [facility], Rebecca Liz MD    Office Visit    9 months ago Screening for colon cancer    [facility], [facility], Rebecca Liz MD    Office Visit    1 year ago Prediabetes    [facility], [facility], Rebecca Liz MD    Office Visit                           Recent Outpatient Visits              4 months ago Screening for colon cancer    [facility], [facility], Rebecca Liz MD    Office Visit    7 months ago Anxiety    [facility], [facility], Rebecca Liz MD    Office Visit    8 months ago Cough, unspecified type    [facility], [facility], Rebecca Liz MD    Office Visit    9 months ago Screening for colon cancer    [facility], [facility], Rebecca Liz MD    Office Visit    1 year ago Prediabetes    [facility], [facility], Rebecca Liz MD    Office Visit

## 2024-02-14 NOTE — TELEPHONE ENCOUNTER
Please review; protocol failed/No Protocol  Requested Prescriptions   Pending Prescriptions Disp Refills    MONTELUKAST 10 MG Oral Tab [Pharmacy Med Name: MONTELUKAST 10MG TABLETS] 90 tablet 1     Sig: TAKE 1 TABLET(10 MG) BY MOUTH DAILY       Asthma & COPD Medication Protocol Failed - 2/12/2024  8:55 AM        Failed - Asthma Action Score greater than or equal to 20        Failed - AAP/ACT given in last 12 months     No data recorded  No data recorded  No data recorded  No data recorded          Passed - Appointment in past 6 or next 3 months      Recent Outpatient Visits              4 months [facility]    7 months ago [facility]    [facility]    [facility]    8 months ago Cough, unspecified type    [facility]    [facility]    9 months ago [facility] for colon cancer    [facility]    [facility]    1 year ago Prediabetes    [facility]    [facility]          Future Appointments         Provider Department Appt Notes    In 1 month Rebecca Barrios MD [facility] f/u meds                  Future Appointments         Provider Department Appt Notes    In 1 month Rebecca Barrios MD [facility], Deep[place] f/u meds          Recent Outpatient Visits              4 months ago Screening for colon cancer    [facility]    Office [facility]    7 months ago Anxiety    [facility]    Office Visit    8 months ago Cough, unspecified type    [facility],  Shaun Stone, Rebecca Liz MD    Office Visit    9 months ago Screening for colon cancer    [place]    Office Visit    1 year ago Prediabetes    [place], Shaun Stone, Rebecca Liz MD    Office Visit

## 2024-02-15 RX ORDER — MONTELUKAST SODIUM 10 MG/1
10 TABLET ORAL DAILY
Qty: 90 TABLET | Refills: 1 | Status: SHIPPED | OUTPATIENT
Start: 2024-02-15

## 2024-03-15 NOTE — TELEPHONE ENCOUNTER
Please review. Protocol failed / No Protocol.  Future Appointments   Date Time Provider Department Center   3/22/2024  7:30 AM Rebecca Barrios MD [facility]       Requested Prescriptions   Pending Prescriptions Disp Refills    AMLODIPINE 5 MG Oral Tab [Pharmacy Med Name: AMLODIPINE BESYLATE 5MG TABLETS] 30 tablet 0     Sig: TAKE 1 TABLET(5 MG) BY MOUTH DAILY       Hypertension Medications Protocol Failed - 3/14/2024  6:56 AM        Failed - CMP or BMP in past 12 months        Failed - EGFRCR or GFRNAA > 50     GFR Evaluation            Passed - Last BP reading less than 140/90     BP Readings from Last 1 Encounters:   10/06/23 119/79               Passed - In person appointment or virtual visit in the past 12 mos or appointment in next 3 mos     Recent Outpatient Visits              5 months [facility]    8 months ago [facility]    Office Visit    9 months ago Cough, unspecified type    [facility]    Office Visit    10 months ago Screening for colon cancer    [facility] Deep[facility] Stone Tanja, MD    Office Visit    1 year ago Prediabetes    [facility]    [facility] Visit          Future Appointments         Provider Department Appt Notes    In 1 week [facility]

## 2024-03-16 RX ORDER — AMLODIPINE BESYLATE 5 MG/1
5 TABLET ORAL DAILY
Qty: 30 TABLET | Refills: 0 | Status: SHIPPED | OUTPATIENT
Start: 2024-03-16

## 2024-03-22 ENCOUNTER — OFFICE VISIT (OUTPATIENT)
Dept: FAMILY MEDICINE CLINIC | Facility: CLINIC | Age: 59
End: 2024-03-22

## 2024-03-22 VITALS
HEIGHT: 67.5 IN | DIASTOLIC BLOOD PRESSURE: 77 MMHG | RESPIRATION RATE: 18 BRPM | BODY MASS INDEX: 27.4 KG/M2 | HEART RATE: 88 BPM | OXYGEN SATURATION: 98 % | WEIGHT: 176.63 LBS | SYSTOLIC BLOOD PRESSURE: 137 MMHG

## 2024-03-22 DIAGNOSIS — Z00.00 ANNUAL PHYSICAL EXAM: ICD-10-CM

## 2024-03-22 DIAGNOSIS — E11.9 DIABETES MELLITUS TYPE 2 IN NONOBESE (HCC): Primary | ICD-10-CM

## 2024-03-22 LAB
CREAT UR-SCNC: 95.1 MG/DL
MICROALBUMIN UR-MCNC: 0.7 MG/DL
MICROALBUMIN/CREAT 24H UR-RTO: 7.4 UG/MG (ref ?–30)

## 2024-03-22 PROCEDURE — 99214 OFFICE O/P EST MOD 30 MIN: CPT | Performed by: FAMILY MEDICINE

## 2024-03-22 NOTE — PROGRESS NOTES
Subjective:   Patient ID: Mireya Krishna is a 58 year old female.    HPI  Patient for f/u hypertension   Denies any chest pain shortness of breath or headaches.   Monitoring blood pressure at home and is below 135/85. Needs refill of medications.   Also f/u prediabetes- stable     History/Other:   Review of Systems    Constitutional: Negative.  Negative for activity change, appetite change, diaphoresis and fatigue.     Respiratory: Negative.  Negative for apnea, cough, chest tightness and shortness of breath.    Cardiovascular: Negative.  Negative for chest pain, palpitations and leg swelling.   Gastrointestinal: Negative.  Negative for abdominal pain.   Skin: Negative.           Psychiatric/Behavioral: Negative.        Current Outpatient Medications   Medication Sig Dispense Refill    amLODIPine 5 MG Oral Tab Take 1 tablet (5 mg total) by mouth daily. 30 tablet 0    montelukast 10 MG Oral Tab Take 1 tablet (10 mg total) by mouth daily. 90 tablet 1    metFORMIN 500 MG Oral Tab Take 1 tablet (500 mg total) by mouth 2 (two) times daily with meals. 180 tablet 1    losartan 50 MG Oral Tab Take 1 tablet (50 mg total) by mouth daily. 90 tablet 3    albuterol (PROVENTIL HFA) 108 (90 Base) MCG/ACT Inhalation Aero Soln Inhale 2 puffs into the lungs every 6 (six) hours as needed for Wheezing. 1 each 1     Allergies:Not on File    Objective:   Physical Exam  Constitutional:       Appearance: She is well-developed.   Cardiovascular:      Rate and Rhythm: Normal rate and regular rhythm.      Heart sounds: Normal heart sounds.   Pulmonary:      Effort: Pulmonary effort is normal.      Breath sounds: Normal breath sounds.   Neurological:      Mental Status: She is alert.      Deep Tendon Reflexes: Reflexes are normal and symmetric.         Assessment & Plan:   1. Diabetes mellitus type 2 in nonobese (HCC)    2. Annual physical exam    Cpm   Hypertension cpm  3. Depression resolved   Orders Placed This Encounter   Procedures     Microalb/Creat Ratio, Random Urine [E]    Comp Metabolic Panel (14)    Lipid Panel    Assay, Thyroid Stim Hormone    CBC With Differential With Platelet       Meds This Visit:  Requested Prescriptions      No prescriptions requested or ordered in this encounter       Imaging & Referrals:  OPHTHALMOLOGY - EXTERNAL

## 2024-04-08 NOTE — TELEPHONE ENCOUNTER
Please review; protocol failed/No Protocol    Outside labs: 05/01/2023  Component  Ref Range & Units 5/1/23 11:14 PM   Sodium  136 - 145 mmol/L 137   Potassium  3.5 - 5.1 mmol/L 5.0   Chloride  99 - 109 mmol/L 105   Carbon Dioxide  19 - 28 mmol/L 19   Anion Gap  mmol/L 13   Blood Urea Nitrogen  7 - 20 mg/dL 15   Creatinine  0.60 - 1.10 mg/dL 0.89   eGFRcr (CKD-EPI 2021)  >=60 mL/min/1.73 m² 76   Calcium  8.7 - 10.7 mg/dL 9.6   Protein, Total  6.4 - 8.3 g/dL 8.1   Glucose  70 - 100 mg/dL 315 High    Albumin  3.4 - 5.0 g/dL 4.6   ALT  0 - 55 units/L 34   AST  5 - 34 units/L 20   Alkaline Phosphatase  40 - 150 units/L 72   Bilirubin, Total  0.3 - 1.2 mg/dL 0.3   Resulting Agency [facility]         Requested Prescriptions   Pending Prescriptions Disp Refills    AMLODIPINE 5 MG Oral Tab [Pharmacy Med Name: AMLODIPINE BESYLATE 5MG TABLETS] 30 tablet 0     Sig: TAKE 1 TABLET(5 MG) BY MOUTH DAILY       Hypertension Medications Protocol Failed - 4/7/2024  5:49 AM        Failed - CMP or BMP in past 12 months        Failed - EGFRCR or GFRNAA > 50     GFR Evaluation            Passed - Last BP reading less than 140/90     BP Readings from Last 1 Encounters:   03/22/24 137/77               Passed - In person appointment or virtual visit in the past 12 mos or appointment in next 3 mos     Recent Outpatient Visits              2 weeks ago Diabetes mellitus type 2 in nonobese (HCC)    [facility]    [facility] Visit    6 months ago [facility]    [facility]    8 months ago [facility]    [facility]    [facility] Visit    10 months ago Cough, unspecified type    [facility]    [facility] Visit    11 months ago Screening for colon cancer    [facility]    [facility]                           Recent Outpatient Visits              2 weeks ago Diabetes mellitus type 2 in nonobese (HCC)    [facility]    Office Visit    6 months ago Screening for colon cancer    [facility]    Office Visit    8 months ago Anxiety    [facility]    Office Visit    10 months ago Cough, unspecified type    [facility]    [facility] Visit    11 months ago Screening for colon cancer    [facility]    [facility] Visit

## 2024-04-09 RX ORDER — AMLODIPINE BESYLATE 5 MG/1
5 TABLET ORAL DAILY
Qty: 90 TABLET | Refills: 0 | Status: SHIPPED | OUTPATIENT
Start: 2024-04-09

## 2024-07-10 RX ORDER — AMLODIPINE BESYLATE 5 MG/1
5 TABLET ORAL DAILY
Qty: 90 TABLET | Refills: 0 | Status: SHIPPED | OUTPATIENT
Start: 2024-07-10

## 2024-07-10 NOTE — TELEPHONE ENCOUNTER
Message noted: Chart reviewed and may refill medication as requested. Prescription sent to listed pharmacy. Please notify patient. Further refills as per Dr. Barrios.

## 2024-07-10 NOTE — TELEPHONE ENCOUNTER
Please review; protocol failed/ has no protocol      Routing to pod mate as  is out of office   Message sent for patient to have labs done.        Requested Prescriptions   Pending Prescriptions Disp Refills    AMLODIPINE 5 MG Oral Tab [Pharmacy Med Name: AMLODIPINE BESYLATE 5MG TABLETS] 90 tablet 0     Sig: TAKE 1 TABLET(5 MG) BY MOUTH DAILY       Hypertension Medications Protocol Failed - 7/6/2024  5:48 AM        Failed - CMP or BMP in past 12 months        Failed - EGFRCR or GFRNAA > 50     GFR Evaluation            Passed - Last BP reading less than 140/90     BP Readings from Last 1 Encounters:   03/22/24 137/77               Passed - In person appointment or virtual visit in the past 12 mos or appointment in next 3 mos     Recent Outpatient Visits              3 months ago Diabetes mellitus type 2 in nonobese ([facility])    [facility]    [facility]    9 months [facility]    [facility]    12 months ago Anxiety    [facility]    [facility] Visit    1 year ago Cough, unspecified type    [facility]    [facility] Visit    1 year ago [facility] for colon cancer    [facility]    [facility]                         Recent Outpatient Visits              3 months ago Diabetes mellitus type 2 in nonobese ([facility])    [facility]    Office Visit    9 months ago [facility] for colon cancer    [facility]    Office Visit    12 months ago Anxiety    [facility]    Office Visit    1 year ago Cough,  unspecified type    [facility]    Office Visit    1 year ago Screening for colon cancer    [facility]    Office Visit

## 2024-08-09 RX ORDER — MONTELUKAST SODIUM 10 MG/1
10 TABLET ORAL DAILY
Qty: 90 TABLET | Refills: 0 | Status: SHIPPED | OUTPATIENT
Start: 2024-08-09

## 2024-08-09 RX ORDER — LOSARTAN POTASSIUM 50 MG/1
50 TABLET ORAL DAILY
Qty: 90 TABLET | Refills: 0 | Status: SHIPPED | OUTPATIENT
Start: 2024-08-09

## 2024-08-09 NOTE — TELEPHONE ENCOUNTER
To pharmacy: Needs to make an apt for further refills     MyChart message sent to patient to schedule an office visit with PCP.   Please make a phone attempt.

## 2024-08-09 NOTE — TELEPHONE ENCOUNTER
Please review; protocol failed/ has no protocol      Message sent for patient to have labs done.    Requested Prescriptions   Pending Prescriptions Disp Refills    LOSARTAN 50 MG Oral Tab [Pharmacy Med Name: LOSARTAN 50MG TABLETS] 90 tablet 3     Sig: TAKE 1 TABLET(50 MG) BY MOUTH DAILY       Hypertension Medications Protocol Failed - 8/5/2024  5:53 AM        Failed - CMP or BMP in past 12 months        Failed - EGFRCR or GFRNAA > 50     GFR Evaluation            Passed - Last BP reading less than 140/90     BP Readings from Last 1 Encounters:   03/22/24 137/77               Passed - In person appointment or virtual visit in the past 12 mos or appointment in next 3 mos     Recent Outpatient Visits              4 months ago Diabetes mellitus type 2 in nonobese (HCC)    [facility]    [facility] Visit    10 months ago Screening for colon cancer    [facility]    Office Visit    1 year ago Anxiety    [facility]    [facility] Visit    1 year ago [facility]ough, unspecified type    [facility]    1 year ago [facility] colon [facility]    [facility]                        METFORMIN 500 MG Oral Tab [Pharmacy Med Name: METFORMIN 500MG TABLETS] 180 tablet 1     Sig: TAKE 1 TABLET(500 MG) BY MOUTH TWICE DAILY WITH MEALS       Diabetes Medication Protocol Failed - 8/5/2024  5:53 AM        Failed - Last A1C < 7.5 and within past 6 months     Lab Results   Component Value Date    A1C 6.3 (A) 10/06/2023             Failed - EGFRCR or GFRNAA > 50     GFR Evaluation            Failed - GFR in the past 12 months        Passed - In person appointment or virtual visit in the past 6 mos or appointment in next 3  mos     Recent Outpatient Visits              4 months ago Diabetes mellitus type 2 in nonobese ([facility])    [facility]    Office Visit    10 months ago Screening for colon cancer    [facility]    Office Visit    1 year ago Anxiety    [facility]    Office Visit    1 year ago Cough, unspecified type    [facility]    Office Visit    1 year ago [facility]    [facility]                      Passed - Microalbumin procedure in past 12 months or taking ACE/ARB          MONTELUKAST 10 MG Oral Tab [Pharmacy Med Name: MONTELUKAST 10MG TABLETS] 90 tablet 1     Sig: TAKE 1 TABLET(10 MG) BY MOUTH DAILY       Asthma & COPD Medication Protocol Failed - 8/5/2024  5:53 AM        Failed - Asthma Action Score greater than or equal to 20        Failed - AAP/ACT given in last 12 months     No data recorded  No data recorded  No data recorded  No data recorded          Passed - Appointment in past 6 or next 3 months      Recent Outpatient Visits              4 months ago Diabetes mellitus type 2 in nonobese ([facility])    [facility]    Office Visit    10 months ago [facility] for colon cancer    [facility]    Office Visit    1 year ago Anxiety    [facility]    Office Visit    1 year ago Cough, unspecified type    [facility]    Office Visit    1 year ago [facility] for colon cancer    [facility]    Office Visit                         Recent Outpatient Visits              4 months ago Diabetes mellitus type 2 in nonobese (HCC)    [facility], [facility], Rebecca Liz MD    Office Visit    10 months ago Screening for colon cancer    [facility], Rebecca Liz MD    Office Visit    1 year ago Anxiety    [facility], Rebecca Liz MD    Office Visit    1 year ago Cough, unspecified type    [facility], Rebecca Liz MD    Office Visit    1 year ago Screening for colon cancer    [facility], Rebecca Liz MD    Office Visit

## 2024-08-14 ENCOUNTER — TELEPHONE (OUTPATIENT)
Dept: INTERNAL MEDICINE UNIT | Facility: HOSPITAL | Age: 59
End: 2024-08-14

## 2024-08-17 ENCOUNTER — LAB ENCOUNTER (OUTPATIENT)
Dept: LAB | Age: 59
End: 2024-08-17
Attending: FAMILY MEDICINE
Payer: COMMERCIAL

## 2024-08-17 DIAGNOSIS — Z00.00 ANNUAL PHYSICAL EXAM: ICD-10-CM

## 2024-08-17 DIAGNOSIS — E11.9 DIABETES MELLITUS TYPE 2 IN NONOBESE (HCC): ICD-10-CM

## 2024-08-17 LAB
ALBUMIN SERPL-MCNC: 4.6 G/DL (ref 3.2–4.8)
ALBUMIN/GLOB SERPL: 1.7 {RATIO} (ref 1–2)
ALP LIVER SERPL-CCNC: 77 U/L
ALT SERPL-CCNC: 18 U/L
ANION GAP SERPL CALC-SCNC: 6 MMOL/L (ref 0–18)
AST SERPL-CCNC: 17 U/L (ref ?–34)
BASOPHILS # BLD AUTO: 0.03 X10(3) UL (ref 0–0.2)
BASOPHILS NFR BLD AUTO: 0.7 %
BILIRUB SERPL-MCNC: 0.6 MG/DL (ref 0.3–1.2)
BUN BLD-MCNC: 14 MG/DL (ref 9–23)
BUN/CREAT SERPL: 17.1 (ref 10–20)
CALCIUM BLD-MCNC: 9.5 MG/DL (ref 8.7–10.4)
CHLORIDE SERPL-SCNC: 107 MMOL/L (ref 98–112)
CHOLEST SERPL-MCNC: 240 MG/DL (ref ?–200)
CO2 SERPL-SCNC: 29 MMOL/L (ref 21–32)
CREAT BLD-MCNC: 0.82 MG/DL
DEPRECATED RDW RBC AUTO: 40 FL (ref 35.1–46.3)
EGFRCR SERPLBLD CKD-EPI 2021: 83 ML/MIN/1.73M2 (ref 60–?)
EOSINOPHIL # BLD AUTO: 0.06 X10(3) UL (ref 0–0.7)
EOSINOPHIL NFR BLD AUTO: 1.4 %
ERYTHROCYTE [DISTWIDTH] IN BLOOD BY AUTOMATED COUNT: 12.6 % (ref 11–15)
FASTING PATIENT LIPID ANSWER: YES
FASTING STATUS PATIENT QL REPORTED: YES
GLOBULIN PLAS-MCNC: 2.7 G/DL (ref 2–3.5)
GLUCOSE BLD-MCNC: 136 MG/DL (ref 70–99)
HCT VFR BLD AUTO: 41.3 %
HDLC SERPL-MCNC: 43 MG/DL (ref 40–59)
HGB BLD-MCNC: 14.3 G/DL
IMM GRANULOCYTES # BLD AUTO: 0.01 X10(3) UL (ref 0–1)
IMM GRANULOCYTES NFR BLD: 0.2 %
LDLC SERPL CALC-MCNC: 158 MG/DL (ref ?–100)
LYMPHOCYTES # BLD AUTO: 1.4 X10(3) UL (ref 1–4)
LYMPHOCYTES NFR BLD AUTO: 32.7 %
MCH RBC QN AUTO: 30 PG (ref 26–34)
MCHC RBC AUTO-ENTMCNC: 34.6 G/DL (ref 31–37)
MCV RBC AUTO: 86.6 FL
MONOCYTES # BLD AUTO: 0.28 X10(3) UL (ref 0.1–1)
MONOCYTES NFR BLD AUTO: 6.5 %
NEUTROPHILS # BLD AUTO: 2.5 X10 (3) UL (ref 1.5–7.7)
NEUTROPHILS # BLD AUTO: 2.5 X10(3) UL (ref 1.5–7.7)
NEUTROPHILS NFR BLD AUTO: 58.5 %
NONHDLC SERPL-MCNC: 197 MG/DL (ref ?–130)
OSMOLALITY SERPL CALC.SUM OF ELEC: 297 MOSM/KG (ref 275–295)
PLATELET # BLD AUTO: 199 10(3)UL (ref 150–450)
POTASSIUM SERPL-SCNC: 4.3 MMOL/L (ref 3.5–5.1)
PROT SERPL-MCNC: 7.3 G/DL (ref 5.7–8.2)
RBC # BLD AUTO: 4.77 X10(6)UL
SODIUM SERPL-SCNC: 142 MMOL/L (ref 136–145)
TRIGL SERPL-MCNC: 210 MG/DL (ref 30–149)
TSI SER-ACNC: 2.6 MIU/ML (ref 0.55–4.78)
VLDLC SERPL CALC-MCNC: 41 MG/DL (ref 0–30)
WBC # BLD AUTO: 4.3 X10(3) UL (ref 4–11)

## 2024-08-17 PROCEDURE — 36415 COLL VENOUS BLD VENIPUNCTURE: CPT

## 2024-08-17 PROCEDURE — 80061 LIPID PANEL: CPT

## 2024-08-17 PROCEDURE — 80053 COMPREHEN METABOLIC PANEL: CPT

## 2024-08-17 PROCEDURE — 85025 COMPLETE CBC W/AUTO DIFF WBC: CPT

## 2024-08-17 PROCEDURE — 83036 HEMOGLOBIN GLYCOSYLATED A1C: CPT

## 2024-08-17 PROCEDURE — 84443 ASSAY THYROID STIM HORMONE: CPT

## 2024-08-18 LAB
EST. AVERAGE GLUCOSE BLD GHB EST-MCNC: 131 MG/DL (ref 68–126)
HBA1C MFR BLD: 6.2 % (ref ?–5.7)

## 2024-10-07 RX ORDER — AMLODIPINE BESYLATE 5 MG/1
5 TABLET ORAL DAILY
Qty: 90 TABLET | Refills: 0 | Status: SHIPPED | OUTPATIENT
Start: 2024-10-07

## 2024-10-12 ENCOUNTER — OFFICE VISIT (OUTPATIENT)
Dept: FAMILY MEDICINE CLINIC | Facility: CLINIC | Age: 59
End: 2024-10-12

## 2024-10-12 VITALS
HEART RATE: 81 BPM | DIASTOLIC BLOOD PRESSURE: 82 MMHG | BODY MASS INDEX: 26.22 KG/M2 | SYSTOLIC BLOOD PRESSURE: 120 MMHG | WEIGHT: 169 LBS | HEIGHT: 67.5 IN

## 2024-10-12 DIAGNOSIS — H02.849 SWELLING OF EYELID, UNSPECIFIED LATERALITY: Primary | ICD-10-CM

## 2024-10-12 NOTE — PROGRESS NOTES
Subjective:   Patient ID: Mireya Krishna is a 58 year old female.    Patient here for annual physical   Doing well   HbA1C controlled   Complaining about especially in the morning drooping  eyelids and hard to see at times   Here for annual physical     History/Other:   Review of Systems    Constitutional: Negative.  Negative for activity change, appetite change, diaphoresis and fatigue.   EYE see hpi   Respiratory: Negative.  Negative for apnea, cough, chest tightness and shortness of breath.    Cardiovascular: Negative.  Negative for chest pain, palpitations and leg swelling.   Gastrointestinal: Negative.  Negative for abdominal pain.   Skin: Negative.           Psychiatric/Behavioral: Negative.        Current Outpatient Medications   Medication Sig Dispense Refill    AMLODIPINE 5 MG Oral Tab TAKE 1 TABLET(5 MG) BY MOUTH DAILY 90 tablet 0    losartan 50 MG Oral Tab Take 1 tablet (50 mg total) by mouth daily. 90 tablet 0    metFORMIN 500 MG Oral Tab Take 1 tablet (500 mg total) by mouth 2 (two) times daily with meals. 180 tablet 0    montelukast 10 MG Oral Tab Take 1 tablet (10 mg total) by mouth daily. 90 tablet 0    albuterol (PROVENTIL HFA) 108 (90 Base) MCG/ACT Inhalation Aero Soln Inhale 2 puffs into the lungs every 6 (six) hours as needed for Wheezing. 1 each 1     Allergies:Allergies[1]    Objective:   Physical Exam  Constitutional:       Appearance: She is well-developed.   Cardiovascular:      Rate and Rhythm: Normal rate and regular rhythm.      Heart sounds: Normal heart sounds.   Pulmonary:      Effort: Pulmonary effort is normal.      Breath sounds: Normal breath sounds.   Abdominal:      General: Bowel sounds are normal.      Palpations: Abdomen is soft.   Skin:     General: Skin is warm and dry.   Neurological:      Mental Status: She is alert and oriented to person, place, and time.      Deep Tendon Reflexes: Reflexes are normal and symmetric.     Heent eyelid swelling     Assessment & Plan:   1.  Swelling of eyelid, unspecified laterality    Needs to see ophthalmologist   2. Annual physical doing well cpm    No orders of the defined types were placed in this encounter.      Meds This Visit:  Requested Prescriptions      No prescriptions requested or ordered in this encounter       Imaging & Referrals:  OPHTHALMOLOGY - INTERNAL       [1] No Known Allergies

## 2024-10-24 ENCOUNTER — PATIENT OUTREACH (OUTPATIENT)
Dept: CASE MANAGEMENT | Age: 59
End: 2024-10-24

## 2024-10-24 PROBLEM — E11.9 TYPE 2 DIABETES MELLITUS (HCC): Status: RESOLVED | Noted: 2022-02-18 | Resolved: 2024-10-24

## 2024-10-24 NOTE — PROCEDURES
The office order to remove patient from the diabetes registry is Approved and finalized on October 24, 2024.

## 2024-11-08 RX ORDER — LOSARTAN POTASSIUM 50 MG/1
50 TABLET ORAL DAILY
Qty: 90 TABLET | Refills: 3 | Status: SHIPPED | OUTPATIENT
Start: 2024-11-08

## 2024-11-08 NOTE — TELEPHONE ENCOUNTER
Please review; protocol failed/ has no protocol    Requested Prescriptions   Pending Prescriptions Disp Refills    montelukast 10 MG Oral Tab [Pharmacy Med Name: MONTELUKAST 10MG TABLETS] 90 tablet 0     Sig: Take 1 tablet (10 mg total) by mouth daily.       Asthma & COPD Medication Protocol Failed - 11/8/2024  4:25 PM        Failed - ACT Score greater than or equal to 20                Failed - ACT recorded in the last 12 months                Passed - Appointment in past 6 or next 3 months      Recent Outpatient Visits              3 weeks ago Swelling of eyelid, unspecified laterality    [facility]    [facility]    7 months ago Diabetes mellitus type 2 in nonobese (HCC)    [facility]    Office Visit    1 year ago Screening for colon cancer    [facility]    Office Visit    1 year ago Anxiety    [facility]    Office Visit    1 year ago Cough, unspecified type    [facility]    [facility] Visit          Future Appointments         Provider Department Appt Notes    In 3 months [facility]                     Signed Prescriptions Disp Refills    metFORMIN 500 MG Oral Tab 180 tablet 3     Sig: Take 1 tablet (500 mg total) by mouth 2 (two) times daily with meals.       Diabetes Medication Protocol Passed - 11/8/2024  4:25 PM        Passed - Last A1C < 7.5 and within past 6 months     Lab Results   Component Value Date    A1C 6.2 (H) 08/17/2024             Passed - In person appointment or virtual visit in the past 6 mos or appointment in next 3 mos     Recent Outpatient Visits              3 weeks ago Swelling of eyelid, unspecified laterality     [facility]    Office Visit    7 months ago Diabetes mellitus type 2 in nonobese (HCC)    [facility] Tanja, [facility]    Office Visit    1 year ago [facility]    [facility] Visit    1 year ago Anxiety    [facility] Stone Tanja, [facility]    Office Visit    1 year ago Cough, unspecified type    [facility]    [facility]          Future Appointments         Provider Department Appt Notes    In 3 months [facility]                    Passed - Microalbumin procedure in past 12 months or taking ACE/ARB        Passed - EGFRCR or GFRNAA > 50     GFR Evaluation  EGFRCR: 83 , resulted on 8/17/2024          Passed - GFR in the past 12 months          losartan 50 MG Oral Tab 90 tablet 3     Sig: Take 1 tablet (50 mg total) by mouth daily.       Hypertension Medications Protocol Passed - 11/8/2024  4:25 PM        Passed - CMP or BMP in past 12 months        Passed - Last BP reading less than 140/90     BP Readings from Last 1 Encounters:   10/12/24 120/82               Passed - In person appointment or virtual visit in the past 12 mos or appointment in next 3 mos     Recent Outpatient Visits              3 weeks ago Swelling of eyelid, unspecified laterality    [facility]    [facility]    7 months ago Diabetes mellitus type 2 in nonobese ([facility])    [facility]    [facility] Visit    1 year ago Screening for colon cancer    [facility]    Office Visit    1 year ago Anxiety     [facility]    Office Visit    1 year ago [facility], unspecified type    [facility]          Future Appointments         Provider Department Appt Notes    In 3 months [facility]                    Passed - EGFRCR or GFRNAA > 50     GFR Evaluation  EGFRCR: 83 , resulted on 8/17/2024             Recent Outpatient Visits              3 weeks ago Swelling of eyelid, unspecified laterality    [facility]    [facility]    7 months ago Diabetes mellitus type 2 in nonobese (HCC)    [facility]    [facility]    1 year ago [facility] colon cancer    [facility]    [facility]    1 year ago [facility]    [facility]    1 year ago Cough, unspecified type    [facility]    [facility]          Future Appointments         Provider Department Appt Notes    In 3 months [facility]

## 2024-11-08 NOTE — TELEPHONE ENCOUNTER
Refill passed per [facility] protocol.  Requested Prescriptions   Pending Prescriptions Disp Refills    MONTELUKAST 10 MG Oral Tab [Pharmacy Med Name: MONTELUKAST 10MG TABLETS] 90 tablet 0     Sig: TAKE 1 TABLET(10 MG) BY MOUTH DAILY       Asthma & COPD Medication Protocol Failed - 11/8/2024  4:24 PM        Failed - ACT Score greater than or equal to 20                Failed - ACT recorded in the last 12 months                Passed - Appointment in past 6 or next 3 months      Recent Outpatient Visits              3 weeks ago [facility] eyelid, [facility] laterality    [facility]    [facility]    7 months ago Diabetes mellitus type 2 in nonobese (HCC)    [facility]    [facility]    1 year ago [facility]    [facility]    1 year ago [facility]xiety    [facility]    [facility]    1 year ago Cough, unspecified type    [facility]          Future Appointments         Provider Department Appt Notes    In 3 months Rebecca Barrios MD [facility] f/u                      METFORMIN 500 MG Oral Tab [Pharmacy Med Name: METFORMIN 500MG TABLETS] 180 tablet 0     Sig: TAKE 1 TABLET(500 MG) BY MOUTH TWICE DAILY WITH MEALS       Diabetes Medication Protocol Passed - 11/8/2024  4:24 PM        Passed - Last A1C < 7.5 and within past 6 months     Lab Results   Component Value Date    A1C 6.2 (H) 08/17/2024             Passed - In person appointment or virtual visit in the past 6 mos or appointment in next 3 mos     Recent Outpatient Visits              3 weeks ago Swelling of eyelid, unspecified laterality    [facility]    Office Visit    7 months ago Diabetes mellitus type 2 in nonobese (HCC)    [facility] Tanja, [facility]    Office Visit    1 year ago Screening for colon cancer    [facility]    Office Visit    1 year ago Anxiety    [facility]    [facility] Visit    1 year ago [facility]ough, unspecified type    [facility]          Future Appointments         Provider Department Appt Notes    In 3 months [facility]                    Passed - Microalbumin procedure in past 12 months or taking ACE/ARB        Passed - EGFRCR or GFRNAA > 50     GFR Evaluation  EGFRCR: 83 , resulted on 8/17/2024          Passed - GFR in the past 12 months          LOSARTAN 50 MG Oral Tab [Pharmacy Med Name: LOSARTAN 50MG TABLETS] 90 tablet 0     Sig: TAKE 1 TABLET(50 MG) BY MOUTH DAILY       Hypertension Medications Protocol Passed - 11/8/2024  4:24 PM        Passed - CMP or BMP in past 12 months        Passed - Last BP reading less than 140/90     BP Readings from Last 1 Encounters:   10/12/24 120/82               Passed - In person appointment or virtual visit in the past 12 mos or appointment in next 3 mos     Recent Outpatient Visits              3 weeks ago Swelling of eyelid, unspecified laterality    [facility]    [facility] Visit    7 months ago Diabetes mellitus type 2 in nonobese ([facility])    [facility]    Office Visit    1 year ago Screening for colon cancer    [facility]    Office Visit    1 year  ago Anxiety    [facility]    [facility] Visit    1 year ago Cough, unspecified type    [facility]    [facility]          Future Appointments         Provider Department Appt Notes    In 3 months Rebecca Barrios MD [facility], [facility] f/u                    Passed - EGFRCR or GFRNAA > 50     GFR Evaluation  EGFRCR: 83 , resulted on 8/17/2024             Recent Outpatient Visits              3 weeks ago Swelling [facility] eyelid, unspecified laterality    [facility]    [facility] Visit    7 months ago Diabetes mellitus type 2 in nonobese (HCC)    [facility]    Office Visit    1 year ago Screening for colon cancer    [facility]    Office Visit    1 year ago Anxiety    [facility]    Office Visit    1 year ago Cough, unspecified type    [facility]    [facility]          Future Appointments         Provider Department Appt Notes    In 3 months [facility]

## 2024-11-09 RX ORDER — MONTELUKAST SODIUM 10 MG/1
10 TABLET ORAL DAILY
Qty: 90 TABLET | Refills: 0 | Status: SHIPPED | OUTPATIENT
Start: 2024-11-09

## 2025-01-08 RX ORDER — AMLODIPINE BESYLATE 5 MG/1
5 TABLET ORAL DAILY
Qty: 90 TABLET | Refills: 3 | Status: SHIPPED | OUTPATIENT
Start: 2025-01-08

## 2025-01-08 NOTE — TELEPHONE ENCOUNTER
Refill passed per [facility] protocol.    Requested Prescriptions   Pending Prescriptions Disp Refills    AMLODIPINE 5 MG Oral Tab [Pharmacy Med Name: AMLODIPINE BESYLATE 5MG TABLETS] 90 tablet 0     Sig: TAKE 1 TABLET(5 MG) BY MOUTH DAILY       Hypertension Medications Protocol Passed - 1/8/2025 11:52 AM        Passed - CMP or BMP in past 12 months        Passed - Last BP reading less than 140/90     BP Readings from Last 1 Encounters:   10/12/24 120/82               Passed - In person appointment or virtual visit in the past 12 mos or appointment in next 3 mos     Recent Outpatient Visits              2 months ago Swelling [facility] eyelid, unspecified laterality    [facility]    [facility]    9 months ago Diabetes mellitus type 2 in nonobese (HCC)    [facility]    [facility] Visit    1 year ago Scre[facility] for colon cancer    [facility]    [facility]    1 year ago Anxiety    [facility]    [facility] Visit    1 year ago Cough, unspecified type    [facility]    [facility]          Future Appointments         Provider Department Appt Notes    In 1 month [facility]                    Passed - EGFRCR or GFRNAA > 50     GFR Evaluation  EGFRCR: 83 , resulted on 8/17/2024          Passed - Medication is active on med list           Future Appointments         Provider Department Appt Notes    In 1 month [facility]          Recent Outpatient Visits              2 months ago Swelling of eyelid, unspecified laterality    [facility],  Rebecca Liz MD    Office Visit    9 months ago Diabetes mellitus type 2 in nonobese (HCC)    [facility], Rebecca Liz MD    Office Visit    1 year ago Screening for colon cancer    [facility], Rebecca Liz MD    Office Visit    1 year ago Anxiety    [facility], Rebecca Liz MD    Office Visit    1 year ago Cough, unspecified type    [facility], Rebecca Liz MD    Office Visit

## 2025-02-07 RX ORDER — MONTELUKAST SODIUM 10 MG/1
10 TABLET ORAL DAILY
Qty: 90 TABLET | Refills: 3 | Status: SHIPPED | OUTPATIENT
Start: 2025-02-07

## 2025-02-07 NOTE — TELEPHONE ENCOUNTER
Please Review. Protocol Failed; No Protocol     Requested Prescriptions   Pending Prescriptions Disp Refills    MONTELUKAST 10 MG Oral Tab [Pharmacy Med Name: MONTELUKAST 10MG TABLETS] 90 tablet 0     Sig: TAKE 1 TABLET(10 MG) BY MOUTH DAILY       Asthma & COPD Medication Protocol Failed - 2/7/2025  9:19 AM        Failed - ACT Score greater than or equal to 20                Failed - ACT recorded in the last 12 months                Passed - Appointment in past 6 or next 3 months      Recent Outpatient Visits              3 months ago Swelling of eyelid, unspecified laterality    [facility]    [facility]    10 months ago Diabetes mellitus type 2 in nonobese ([facility])    [facility]    Office Visit    1 year ago Screening for colon cancer    [facility]    Office Visit    1 year ago Anxiety    [facility]    [facility] Visit    1 year ago Cough, unspecified type    [facility]    [facility]          Future Appointments         Provider Department Appt Notes    Tomorrow [facility]                    Passed - Medication is active on med list               Future Appointments         Provider Department Appt Notes    Tomorrow [facility]          Recent Outpatient Visits              3 months ago Swelling of eyelid, unspecified laterality    [facility]    Office Visit    10 months ago Diabetes mellitus type 2 in nonobese ([facility])    [facility]    Office Visit     1 year ago Screening for colon cancer    [facility], Rebecca Liz MD    Office Visit    1 year ago Anxiety    [facility], Rebecca Liz MD    Office Visit    1 year ago Cough, unspecified type    [facility], Rebecca Liz MD    Office Visit

## 2025-02-08 ENCOUNTER — OFFICE VISIT (OUTPATIENT)
Dept: FAMILY MEDICINE CLINIC | Facility: CLINIC | Age: 60
End: 2025-02-08

## 2025-02-08 VITALS
BODY MASS INDEX: 26.99 KG/M2 | SYSTOLIC BLOOD PRESSURE: 122 MMHG | DIASTOLIC BLOOD PRESSURE: 84 MMHG | HEIGHT: 67.5 IN | OXYGEN SATURATION: 96 % | HEART RATE: 89 BPM | WEIGHT: 174 LBS

## 2025-02-08 DIAGNOSIS — E78.00 PURE HYPERCHOLESTEROLEMIA: ICD-10-CM

## 2025-02-08 DIAGNOSIS — Z12.11 SCREENING FOR COLON CANCER: ICD-10-CM

## 2025-02-08 DIAGNOSIS — Z12.31 VISIT FOR SCREENING MAMMOGRAM: ICD-10-CM

## 2025-02-08 DIAGNOSIS — R73.03 PREDIABETES: Primary | ICD-10-CM

## 2025-02-08 LAB — HEMOGLOBIN A1C: 6.2 % (ref 4.3–5.6)

## 2025-02-08 PROCEDURE — 83036 HEMOGLOBIN GLYCOSYLATED A1C: CPT | Performed by: FAMILY MEDICINE

## 2025-02-08 PROCEDURE — 99213 OFFICE O/P EST LOW 20 MIN: CPT | Performed by: FAMILY MEDICINE

## 2025-02-08 RX ORDER — AMLODIPINE BESYLATE 5 MG/1
5 TABLET ORAL DAILY
Qty: 90 TABLET | Refills: 3 | Status: SHIPPED | OUTPATIENT
Start: 2025-02-08

## 2025-02-08 NOTE — PROGRESS NOTES
Subjective:   Patient ID: Mireya Krishna is a 59 year old female.    HPI  Here for f/u prediabetes   HbA1c todays is 6.2   Otherwise feeling well   History/Other:   Review of Systems    Constitutional: Negative.  Negative for activity change, appetite change, diaphoresis and fatigue.     Respiratory: Negative.  Negative for apnea, cough, chest tightness and shortness of breath.    Cardiovascular: Negative.  Negative for chest pain, palpitations and leg swelling.   Gastrointestinal: Negative.  Negative for abdominal pain.   Skin: Negative.       Current Outpatient Medications   Medication Sig Dispense Refill    amLODIPine 5 MG Oral Tab Take 1 tablet (5 mg total) by mouth daily. 90 tablet 3    metFORMIN 500 MG Oral Tab Take 1 tablet (500 mg total) by mouth daily with breakfast. 90 tablet 1    montelukast 10 MG Oral Tab Take 1 tablet (10 mg total) by mouth daily. 90 tablet 3    losartan 50 MG Oral Tab Take 1 tablet (50 mg total) by mouth daily. 90 tablet 3    albuterol (PROVENTIL HFA) 108 (90 Base) MCG/ACT Inhalation Aero Soln Inhale 2 puffs into the lungs every 6 (six) hours as needed for Wheezing. 1 each 1     Allergies:Allergies[1]    Objective:   Physical Exam  Constitutional:       Appearance: She is well-developed.   Cardiovascular:      Rate and Rhythm: Normal rate and regular rhythm.      Heart sounds: Normal heart sounds.   Pulmonary:      Effort: Pulmonary effort is normal.      Breath sounds: Normal breath sounds.   Neurological:      Mental Status: She is alert.      Deep Tendon Reflexes: Reflexes are normal and symmetric.         Assessment & Plan:   1. Prediabetes    2. Visit for screening mammogram    3. Screening for colon cancer    4. Pure hypercholesterolemia    Cpm   F/u in 6 moths     Orders Placed This Encounter   Procedures    POC Hemoglobin A1C    Lipid Panel       Meds This Visit:  Requested Prescriptions     Signed Prescriptions Disp Refills    amLODIPine 5 MG Oral Tab 90 tablet 3     Sig:  Take 1 tablet (5 mg total) by mouth daily.    metFORMIN 500 MG Oral Tab 90 tablet 1     Sig: Take 1 tablet (500 mg total) by mouth daily with breakfast.       Imaging & Referrals:  [facility] LEW 2D+3D SCREENING BILAT (CPT=[id number])  OP REFERRAL TO [facility] GI TELEPHONE COLON SCREEN         [1] No Known Allergies

## 2025-06-30 ENCOUNTER — TELEPHONE (OUTPATIENT)
Dept: FAMILY MEDICINE CLINIC | Facility: CLINIC | Age: 60
End: 2025-06-30

## (undated) DIAGNOSIS — R73.9 HYPERGLYCEMIA: Primary | ICD-10-CM